# Patient Record
(demographics unavailable — no encounter records)

---

## 2024-12-19 NOTE — DISCUSSION/SUMMARY
[FreeTextEntry1] : 29 yo  w/ LMP 24, secondary amenorrhea  by LMP --> MIRELLA 25 by US 16w6d --> MIRELLA 25  - discussed avoidance of NSAIDS, raw meat/fish, uncooked deli meat, unpasteurized cheeses, unwashed fruits/vegetables, cat litter - sent for 1st trimester labs, NIPTS, AFP (prior carrier screening negative) - anatomy scan scheduled - start baby ASA - return to office 3wks prenatal visit
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
